# Patient Record
Sex: FEMALE | Race: WHITE | NOT HISPANIC OR LATINO | Employment: FULL TIME | URBAN - METROPOLITAN AREA
[De-identification: names, ages, dates, MRNs, and addresses within clinical notes are randomized per-mention and may not be internally consistent; named-entity substitution may affect disease eponyms.]

---

## 2017-03-14 ENCOUNTER — COMMUNICATION - HEALTHEAST (OUTPATIENT)
Dept: FAMILY MEDICINE | Facility: CLINIC | Age: 27
End: 2017-03-14

## 2017-03-16 ENCOUNTER — OFFICE VISIT - HEALTHEAST (OUTPATIENT)
Dept: FAMILY MEDICINE | Facility: CLINIC | Age: 27
End: 2017-03-16

## 2017-03-16 DIAGNOSIS — Z30.9 CONTRACEPTIVE MANAGEMENT: ICD-10-CM

## 2017-03-16 DIAGNOSIS — Z30.432 ENCOUNTER FOR IUD REMOVAL: ICD-10-CM

## 2017-03-16 ASSESSMENT — MIFFLIN-ST. JEOR: SCORE: 1372.25

## 2017-04-03 ENCOUNTER — COMMUNICATION - HEALTHEAST (OUTPATIENT)
Dept: FAMILY MEDICINE | Facility: CLINIC | Age: 27
End: 2017-04-03

## 2017-04-03 DIAGNOSIS — Z83.719 FAMILY HISTORY OF POLYPS IN THE COLON: ICD-10-CM

## 2017-04-27 ENCOUNTER — RECORDS - HEALTHEAST (OUTPATIENT)
Dept: ADMINISTRATIVE | Facility: OTHER | Age: 27
End: 2017-04-27

## 2017-04-28 ENCOUNTER — RECORDS - HEALTHEAST (OUTPATIENT)
Dept: ADMINISTRATIVE | Facility: OTHER | Age: 27
End: 2017-04-28

## 2017-09-07 ENCOUNTER — COMMUNICATION - HEALTHEAST (OUTPATIENT)
Dept: FAMILY MEDICINE | Facility: CLINIC | Age: 27
End: 2017-09-07

## 2018-02-07 ENCOUNTER — RECORDS - HEALTHEAST (OUTPATIENT)
Dept: ADMINISTRATIVE | Facility: OTHER | Age: 28
End: 2018-02-07

## 2018-05-03 ENCOUNTER — COMMUNICATION - HEALTHEAST (OUTPATIENT)
Dept: FAMILY MEDICINE | Facility: CLINIC | Age: 28
End: 2018-05-03

## 2018-05-03 DIAGNOSIS — Z30.9 CONTRACEPTIVE MANAGEMENT: ICD-10-CM

## 2018-05-29 ENCOUNTER — OFFICE VISIT - HEALTHEAST (OUTPATIENT)
Dept: FAMILY MEDICINE | Facility: CLINIC | Age: 28
End: 2018-05-29

## 2018-05-29 DIAGNOSIS — Z30.41 ENCOUNTER FOR SURVEILLANCE OF CONTRACEPTIVE PILLS: ICD-10-CM

## 2018-05-29 ASSESSMENT — MIFFLIN-ST. JEOR: SCORE: 1373.15

## 2018-05-31 ENCOUNTER — COMMUNICATION - HEALTHEAST (OUTPATIENT)
Dept: FAMILY MEDICINE | Facility: CLINIC | Age: 28
End: 2018-05-31

## 2018-06-11 ENCOUNTER — COMMUNICATION - HEALTHEAST (OUTPATIENT)
Dept: FAMILY MEDICINE | Facility: CLINIC | Age: 28
End: 2018-06-11

## 2018-07-20 ENCOUNTER — COMMUNICATION - HEALTHEAST (OUTPATIENT)
Dept: FAMILY MEDICINE | Facility: CLINIC | Age: 28
End: 2018-07-20

## 2018-07-20 DIAGNOSIS — Z30.9 CONTRACEPTIVE MANAGEMENT: ICD-10-CM

## 2018-08-27 ENCOUNTER — OFFICE VISIT - HEALTHEAST (OUTPATIENT)
Dept: FAMILY MEDICINE | Facility: CLINIC | Age: 28
End: 2018-08-27

## 2018-08-27 ENCOUNTER — COMMUNICATION - HEALTHEAST (OUTPATIENT)
Dept: SCHEDULING | Facility: CLINIC | Age: 28
End: 2018-08-27
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
## 2018-08-27 ASSESSMENT — MIFFLIN-ST. JEOR: SCORE: 1364.98

## 2019-03-17 ENCOUNTER — COMMUNICATION - HEALTHEAST (OUTPATIENT)
Dept: FAMILY MEDICINE | Facility: CLINIC | Age: 29
End: 2019-03-17

## 2019-03-17 DIAGNOSIS — Z30.9 CONTRACEPTIVE MANAGEMENT: ICD-10-CM

## 2019-06-24 ENCOUNTER — COMMUNICATION - HEALTHEAST (OUTPATIENT)
Dept: FAMILY MEDICINE | Facility: CLINIC | Age: 29
End: 2019-06-24

## 2019-06-24 DIAGNOSIS — L70.8 OTHER ACNE: ICD-10-CM

## 2019-08-01 ENCOUNTER — OFFICE VISIT - HEALTHEAST (OUTPATIENT)
Dept: FAMILY MEDICINE | Facility: CLINIC | Age: 29
End: 2019-08-01

## 2019-08-01 ENCOUNTER — HOSPITAL ENCOUNTER (OUTPATIENT)
Dept: MAMMOGRAPHY | Facility: CLINIC | Age: 29
Discharge: HOME | End: 2019-08-01
Attending: NURSE PRACTITIONER

## 2019-08-01 ENCOUNTER — HOSPITAL ENCOUNTER (OUTPATIENT)
Dept: ULTRASOUND IMAGING | Facility: CLINIC | Age: 29
Discharge: HOME | End: 2019-08-01
Attending: NURSE PRACTITIONER

## 2019-08-01 DIAGNOSIS — N63.0 LUMP OR MASS IN BREAST: ICD-10-CM

## 2019-08-01 DIAGNOSIS — Z98.890 HISTORY OF BILATERAL BREAST REDUCTION SURGERY: ICD-10-CM

## 2019-08-01 ASSESSMENT — MIFFLIN-ST. JEOR: SCORE: 1381.76

## 2019-08-02 ENCOUNTER — HOSPITAL ENCOUNTER (OUTPATIENT)
Dept: MAMMOGRAPHY | Facility: CLINIC | Age: 29
Discharge: HOME | End: 2019-08-02
Attending: NURSE PRACTITIONER

## 2019-08-02 ENCOUNTER — HOSPITAL ENCOUNTER (OUTPATIENT)
Dept: ULTRASOUND IMAGING | Facility: CLINIC | Age: 29
Discharge: HOME | End: 2019-08-02
Attending: NURSE PRACTITIONER | Admitting: RADIOLOGY

## 2019-08-02 DIAGNOSIS — N63.0 LUMP OR MASS IN BREAST: ICD-10-CM

## 2019-08-06 ENCOUNTER — COMMUNICATION - HEALTHEAST (OUTPATIENT)
Dept: FAMILY MEDICINE | Facility: CLINIC | Age: 29
End: 2019-08-06

## 2019-08-08 ENCOUNTER — COMMUNICATION - HEALTHEAST (OUTPATIENT)
Dept: FAMILY MEDICINE | Facility: CLINIC | Age: 29
End: 2019-08-08

## 2019-08-08 ENCOUNTER — COMMUNICATION - HEALTHEAST (OUTPATIENT)
Dept: MAMMOGRAPHY | Facility: CLINIC | Age: 29
End: 2019-08-08

## 2019-08-08 LAB
LAB AP CHARGES (HE HISTORICAL CONVERSION): NORMAL
PATH REPORT.COMMENTS IMP SPEC: NORMAL
PATH REPORT.COMMENTS IMP SPEC: NORMAL
PATH REPORT.FINAL DX SPEC: NORMAL
PATH REPORT.GROSS SPEC: NORMAL
PATH REPORT.MICROSCOPIC SPEC OTHER STN: NORMAL
PATH REPORT.RELEVANT HX SPEC: NORMAL
RESULT FLAG (HE HISTORICAL CONVERSION): NORMAL

## 2019-09-02 ENCOUNTER — COMMUNICATION - HEALTHEAST (OUTPATIENT)
Dept: FAMILY MEDICINE | Facility: CLINIC | Age: 29
End: 2019-09-02

## 2019-09-02 DIAGNOSIS — Z30.9 CONTRACEPTIVE MANAGEMENT: ICD-10-CM

## 2019-09-13 ENCOUNTER — OFFICE VISIT - HEALTHEAST (OUTPATIENT)
Dept: FAMILY MEDICINE | Facility: CLINIC | Age: 29
End: 2019-09-13

## 2019-09-13 DIAGNOSIS — Z12.4 SCREENING FOR CERVICAL CANCER: ICD-10-CM

## 2019-09-13 DIAGNOSIS — Z30.9 CONTRACEPTIVE MANAGEMENT: ICD-10-CM

## 2019-09-13 DIAGNOSIS — Z01.419 WELL FEMALE EXAM WITH ROUTINE GYNECOLOGICAL EXAM: ICD-10-CM

## 2019-09-13 ASSESSMENT — MIFFLIN-ST. JEOR: SCORE: 1371.79

## 2019-10-01 ENCOUNTER — COMMUNICATION - HEALTHEAST (OUTPATIENT)
Dept: FAMILY MEDICINE | Facility: CLINIC | Age: 29
End: 2019-10-01

## 2019-10-01 DIAGNOSIS — L70.8 OTHER ACNE: ICD-10-CM

## 2019-12-23 ENCOUNTER — RECORDS - HEALTHEAST (OUTPATIENT)
Dept: ADMINISTRATIVE | Facility: OTHER | Age: 29
End: 2019-12-23

## 2020-03-02 ENCOUNTER — COMMUNICATION - HEALTHEAST (OUTPATIENT)
Dept: FAMILY MEDICINE | Facility: CLINIC | Age: 30
End: 2020-03-02

## 2020-03-02 DIAGNOSIS — Z30.9 CONTRACEPTIVE MANAGEMENT: ICD-10-CM

## 2020-08-19 ENCOUNTER — COMMUNICATION - HEALTHEAST (OUTPATIENT)
Dept: FAMILY MEDICINE | Facility: CLINIC | Age: 30
End: 2020-08-19

## 2020-08-19 DIAGNOSIS — Z30.9 CONTRACEPTIVE MANAGEMENT: ICD-10-CM

## 2020-11-05 ENCOUNTER — RECORDS - HEALTHEAST (OUTPATIENT)
Dept: ADMINISTRATIVE | Facility: OTHER | Age: 30
End: 2020-11-05

## 2020-11-23 ENCOUNTER — COMMUNICATION - HEALTHEAST (OUTPATIENT)
Dept: FAMILY MEDICINE | Facility: CLINIC | Age: 30
End: 2020-11-23

## 2020-11-23 DIAGNOSIS — Z30.9 CONTRACEPTIVE MANAGEMENT: ICD-10-CM

## 2020-11-30 ENCOUNTER — OFFICE VISIT - HEALTHEAST (OUTPATIENT)
Dept: FAMILY MEDICINE | Facility: CLINIC | Age: 30
End: 2020-11-30

## 2020-11-30 DIAGNOSIS — Z30.9 CONTRACEPTIVE MANAGEMENT: ICD-10-CM

## 2020-11-30 DIAGNOSIS — Z00.00 ROUTINE GENERAL MEDICAL EXAMINATION AT A HEALTH CARE FACILITY: ICD-10-CM

## 2020-11-30 ASSESSMENT — MIFFLIN-ST. JEOR: SCORE: 1388.78

## 2020-12-17 ENCOUNTER — COMMUNICATION - HEALTHEAST (OUTPATIENT)
Dept: ADMINISTRATIVE | Facility: CLINIC | Age: 30
End: 2020-12-17

## 2020-12-17 DIAGNOSIS — Z30.9 CONTRACEPTIVE MANAGEMENT: ICD-10-CM

## 2021-01-14 ENCOUNTER — COMMUNICATION - HEALTHEAST (OUTPATIENT)
Dept: FAMILY MEDICINE | Facility: CLINIC | Age: 31
End: 2021-01-14

## 2021-01-14 DIAGNOSIS — Z30.9 CONTRACEPTIVE MANAGEMENT: ICD-10-CM

## 2021-04-08 ENCOUNTER — COMMUNICATION - HEALTHEAST (OUTPATIENT)
Dept: FAMILY MEDICINE | Facility: CLINIC | Age: 31
End: 2021-04-08

## 2021-04-08 DIAGNOSIS — Z30.9 CONTRACEPTIVE MANAGEMENT: ICD-10-CM

## 2021-04-08 RX ORDER — DESOGESTREL AND ETHINYL ESTRADIOL 0.15-0.03
1 KIT ORAL DAILY
Qty: 84 TABLET | Refills: 1 | Status: SHIPPED | OUTPATIENT
Start: 2021-04-08 | End: 2021-09-22

## 2021-05-30 VITALS — BODY MASS INDEX: 22.34 KG/M2 | HEIGHT: 66 IN | WEIGHT: 139 LBS

## 2021-05-30 NOTE — TELEPHONE ENCOUNTER
RN cannot approve Refill Request    RN can NOT refill this medication med is not covered by policy/route to provider. Last office visit: 5/29/2018 Teresa Fuentes MD Last Physical: Visit date not found Last MTM visit: Visit date not found Last visit same specialty: 8/27/2018 Luz Zamora, Karel Servin, DO.  Next visit within 3 mo: Visit date not found  Next physical within 3 mo: Visit date not found      Smitha Alegre, [facility] [place] Connection Triage/Med Refill 6/25/2019    Requested Prescriptions   Pending Prescriptions Disp Refills     doxycycline (MONODOX) 100 MG capsule [Pharmacy Med Name: DOXYCYCLINE MONOHYDRATE 100MG CAPS] 180 capsule 0     Sig: TAKE 1 CAPSULE BY MOUTH TWICE DAILY WITH FOOD AND WATER. MAY CAUSE NAUSEA/ LIGHT SENSITIVITY       There is no refill protocol information for this order

## 2021-05-30 NOTE — TELEPHONE ENCOUNTER
It does not look like I prescribed this medication in the past.  I refilled this for 90 days, but she should follow-up for a med check or physical prior to running out and we can discuss this further.

## 2021-05-31 NOTE — TELEPHONE ENCOUNTER
Edwina I called [facility] yesterday and they were going to get back to us as to a timeline when results are going to be done.  Have not heard yet. See other messages in chart. I did notify patient of that yesterday. Looks like no results yet.  Patient anxious to get results.  I spoke with Pathology again and Pathologist is not in office at this time and will give him message again.  Patient notified.

## 2021-05-31 NOTE — TELEPHONE ENCOUNTER
I called [facility] pathology and they are looking into a timeline as to when results will be final and will call us back. Patient notified that we are still waiting.

## 2021-05-31 NOTE — TELEPHONE ENCOUNTER
Who is calling:  Patient  skylar for Call:  Patient is questioning if there is anything that can be done to find out why the pathology results are not back yet?  Patient reported she was told that the results would be back yesterday.  Date of last appointment with primary care: 8/1/2019  Okay to leave a detailed message: No

## 2021-05-31 NOTE — TELEPHONE ENCOUNTER
Test Results  Who is calling?:  Patient  Who ordered the test:  Edwina Vazquez CNP   Type of test: Lab lump biopsy breast right  Date of test:  8/2/19  Where was the test performed:  In clinic   What are your questions/concerns?:  Patient is asking if clinic knows when to expect results.?  Please advise.  Okay to leave a detailed message?:  No

## 2021-05-31 NOTE — TELEPHONE ENCOUNTER
RN cannot approve Refill Request    Last medication check 5/29/18.  No pending OV    RN can NOT refill this medication Protocol failed and NO refill given. Last office visit: 5/29/2018 Teresa Fuentes MD Last Physical: Visit date not found Last MTM visit: Visit date not found Last visit same specialty: 8/1/2019 Edwina Vazquez CNP.  Next visit within 3 mo: Visit date not found  Next physical within 3 mo: Visit date not found      [facility] Connection Triage/Med Refill 9/2/2019    Requested Prescriptions   Pending Prescriptions Disp Refills     ISIBLOOM 0.15-0.03 mg per tablet [Pharmacy Med Name: ISIBLOOM 0.15MG-0.03MG TABLETS 28S] 84 tablet 0     Sig: TAKE 1 TABLET BY MOUTH DAILY       Oral Contraceptives Protocol Passed - 9/2/2019 10:35 AM        Passed - Visit with PCP or prescribing provider visit in last 12 months      Last office visit with prescriber/PCP: 5/29/2018 Teresa Fuentes MD OR same dept: 8/1/2019 Edwina Vazquez CNP OR same specialty: 8/1/2019 Edwina Vazquez CNP  Last physical: Visit date not found Last MTM visit: Visit date not found   Next visit within 3 mo: Visit date not found  Next physical within 3 mo: Visit date not found  Prescriber OR PCP: Teresa Fuentes MD  Last diagnosis associated with med order: 1. Contraceptive management  - ISIBLOOM 0.15-0.03 mg per tablet [Pharmacy Med Name: ISIBLOOM 0.15MG-0.03MG TABLETS 28S]; TAKE 1 TABLET BY MOUTH DAILY  Dispense: 84 tablet; Refill: 0    If protocol passes may refill for 12 months if within 3 months of last provider visit (or a total of 15 months).

## 2021-05-31 NOTE — PROGRESS NOTES
"  Assessment/ Plan:         1. Lump or mass in breast  US Breast Limited (Focal) Right   2. History of bilateral breast reduction surgery       Small lump on the right outer lower aspect of the breast likely benign in nature patient has overall fibrocystic breast tissue.  Will obtain an ultrasound to evaluate identified lump more closely.  Patient will be assisted with scheduling this appointment.    She was encouraged to return to the clinic in the next month or so for an annual preventative exam.  She is due for a Pap smear.  She is in agreement with this plan.      Subjective:      Tish Wilcox is a 29 y.o. female who presents for concerns or right breast pain on the outside of the breast. Reports in general lumpier breasts but this feels different. No family history of breast cancer.  She denies any other questions or concerns today.  Otherwise she is feeling well.  She is overdue for an annual physical.  She reports that she will schedule this along with a Pap smear.    The following portions of the patient's history were reviewed and updated as appropriate: allergies, current medications and problem list.    Patient Active Problem List   Diagnosis     Acne     Tinnitus       Current Outpatient Medications   Medication Sig     doxycycline (MONODOX) 100 MG capsule TAKE 1 CAPSULE BY MOUTH TWICE DAILY WITH FOOD AND WATER. MAY CAUSE NAUSEA/ LIGHT SENSITIVITY     ISIBLOOM 0.15-0.03 mg per tablet TAKE 1 TABLET BY MOUTH DAILY       Review of Systems   Pertinent items are noted in HPI.      Objective:      /76   Pulse 76   Temp 97.6  F (36.4  C) (Oral)   Ht 5' 6\" (1.676 m)   Wt 142 lb 3.2 oz (64.5 kg)   LMP 07/31/2019 (Exact Date)   BMI 22.95 kg/m      General appearance: alert, appears stated age and cooperative  Breasts: Inspection negative, No nipple retraction or dimpling, No nipple discharge or bleeding, No axillary or supraclavicular adenopathy, Normal to palpation overall, she does have " fibrocystic breasts. She has had a breast reduction. Lump identified measureing about 1cm in diameter oblong in shape on the right lower outer breast approximately 7 o'clock position. Mobile. No external skin changes associated.   Psych: Speech is fluent and thought process is linear, affect is reactive and appropriate    Edwina Vazquez, VIDA  10:36 AM

## 2021-05-31 NOTE — TELEPHONE ENCOUNTER
Test Results  Who is calling?:  Patient   Who ordered the test:  Edwina Vazquez CNP   Type of test: Lab  Date of test:  08/02/19  Where was the test performed:  [facility]   What are your questions/concerns?:  Patient is requesting for results.  Okay to leave a detailed message?:  No

## 2021-06-01 VITALS — WEIGHT: 140.3 LBS | BODY MASS INDEX: 22.55 KG/M2 | HEIGHT: 66 IN

## 2021-06-01 VITALS — BODY MASS INDEX: 22.26 KG/M2 | WEIGHT: 138.5 LBS | HEIGHT: 66 IN

## 2021-06-01 NOTE — PROGRESS NOTES
Assessment/ Plan:      1. Well female exam with routine gynecological exam     2. Screening for cervical cancer  Gynecologic Cytology (PAP Smear)   3. Contraceptive management  desogestrel-ethinyl estradiol (ISIBLOOM) 0.15-0.03 mg per tablet     Healthy female exam completed today. Discussed lifestyle modifications including weight management, eating a balanced diet and getting regular cardiovascular exercise. Discussed self breast exams and how to complete these. Pap Smear updated today. Refills OCP. Plan yearly annual physicals or sooner as needed.     Subjective:      Tish Wilcox is a 29 y.o. female who presents for an annual exam. The patient is sexually active. The patient participates in regular exercise: yes. The patient reports that there is not domestic violence in her life. No concerns. Would like a refill of her OCP. No new contraindications to being on this medication. Due for a PAP today. Got  this summer.     Healthy Habits:   Regular Exercise: Yes  Sunscreen Use: Yes  Healthy Diet: Yes  Dental Visits Regularly: Yes  Seat Belt: Yes  Sexually active: Yes  Self Breast Exam Monthly:Yes  Hemoccults: No  Flex Sig: No  Colonoscopy: No  Lipid Profile: Yes  Glucose Screen: Yes  Prevention of Osteoporosis: No  Last Dexa: No  Guns at Home:  No      Immunization History   Administered Date(s) Administered     DT (pediatric) 07/23/2002     DTaP, historic 1991, 1991, 1991, 12/09/1992, 07/02/1996     HPV Quadrivalent 08/29/2007, 11/21/2007, 05/29/2008     Hep A, historic 08/29/2007, 11/03/2008     Hep B, historic 07/30/1998, 09/02/1998, 01/13/1999     HiB, historic,unspecified 1991, 1991, 1991, 09/09/1992     Influenza, Seasonal, Inj PF IIV3 09/15/2011     Influenza, inj, historic,unspecified 11/15/2005, 11/09/2006, 11/21/2007, 11/03/2008     MMR 09/09/1992, 07/30/1998     Meningococcal MCV4P 08/15/2005     OPV,Trivalent,Historic(6365-9538 only) 1991,  1991, 1992, 1996     Td,adult,historic,unspecified 2007     Tdap 2007     Typhoid Live, Oral 2008     Immunization status: missing doses of Tdap, would like to wait on this.    No exam data present    Gynecologic History  Patient's last menstrual period was 2019.  Contraception: oral progesterone-only contraceptive  Last Pap: 16. Results were: normal  Last mammogram: n/a. Results were: n/a      OB History    Para Term  AB Living       0         SAB TAB Ectopic Multiple Live Births                   Current Outpatient Medications   Medication Sig Dispense Refill     doxycycline (MONODOX) 100 MG capsule TAKE 1 CAPSULE BY MOUTH TWICE DAILY WITH FOOD AND WATER. MAY CAUSE NAUSEA/ LIGHT SENSITIVITY 180 capsule 0     ISIBLOOM 0.15-0.03 mg per tablet TAKE 1 TABLET BY MOUTH DAILY 28 tablet 0     No current facility-administered medications for this visit.      No past medical history on file.  Past Surgical History:   Procedure Laterality Date     NV REDUCTION OF LARGE BREAST      Description: Breast Surgery Reduction Procedure;  Proc Date: 2011;     REDUCTION MAMMAPLASTY Bilateral       BREAST CORE BIOPSY RIGHT Right 2019     WISDOM TOOTH EXTRACTION       Shellfish containing products  Family History   Problem Relation Age of Onset     Heart disease Mother         CAD     No Medical Problems Father      Diabetes Maternal Uncle         type I     Heart disease Maternal Uncle      Heart disease Maternal Grandfather      Cancer Neg Hx      Breast cancer Neg Hx      Colon cancer Neg Hx      Prostate cancer Neg Hx      Social History     Socioeconomic History     Marital status: Single     Spouse name: Not on file     Number of children: Not on file     Years of education: Not on file     Highest education level: Not on file   Occupational History     Not on file   Social Needs     Financial resource strain: Not on file     Food insecurity:     Worry:  "Not on file     Inability: Not on file     Transportation needs:     Medical: Not on file     Non-medical: Not on file   Tobacco Use     Smoking status: Former Smoker     Smokeless tobacco: Never Used     Tobacco comment: In college socially   Substance and Sexual Activity     Alcohol use: Yes     Alcohol/week: 0.0 - 0.6 oz     Drug use: Not on file     Sexual activity: Yes     Partners: Male     Birth control/protection: IUD   Lifestyle     Physical activity:     Days per week: Not on file     Minutes per session: Not on file     Stress: Not on file   Relationships     Social connections:     Talks on phone: Not on file     Gets together: Not on file     Attends Hinduism service: Not on file     Active member of club or organization: Not on file     Attends meetings of clubs or organizations: Not on file     Relationship status: Not on file     Intimate partner violence:     Fear of current or ex partner: Not on file     Emotionally abused: Not on file     Physically abused: Not on file     Forced sexual activity: Not on file   Other Topics Concern     Not on file   Social History Narrative     Not on file       Review of Systems  General:  Denies problem  Eyes: Denies problem  Ears/Nose/Throat: Denies problem  Cardiovascular: Denies problem  Respiratory:  Denies problem  Gastrointestinal:  Denies problem  Genitourinary: Denies problem  Musculoskeletal:  Denies problem  Skin: Denies problem  Neurologic: Denies problem  Psychiatric: Denies problem  Endocrine: Denies problem  Heme/Lymphatic: Denies problem   Allergic/Immunologic: Denies problem        Objective:         Vitals:    09/13/19 1343   BP: 100/70   Pulse: 72   Resp: 16   Weight: 140 lb (63.5 kg)   Height: 5' 6\" (1.676 m)     Body mass index is 22.6 kg/m .    Physical Exam:  General Appearance: Alert, cooperative, no distress, appears stated age  Head: Normocephalic, without obvious abnormality, atraumatic  Eyes: PERRL, conjunctiva/corneas clear, EOM's " intact  Ears: Normal TM's and external ear canals, both ears  Nose: Nares normal, septum midline,mucosa normal, no drainage  Throat: Lips, mucosa, and tongue normal; teeth and gums normal  Neck: Supple, symmetrical, trachea midline, no adenopathy;  thyroid: not enlarged, symmetric, no tenderness/mass/nodules; no carotid bruit or JVD  Back: Symmetric, no curvature, ROM normal, no CVA tenderness  Lungs: Clear to auscultation bilaterally, respirations unlabored  Breasts: No breast masses, tenderness, asymmetry, or nipple discharge.  Heart: Regular rate and rhythm, S1 and S2 normal, no murmur, rub, or gallop, Abdomen: Soft, non-tender, bowel sounds active all four quadrants,  no masses, no organomegaly  Pelvic:Normally developed genitalia with no external lesions or eruptions. Vagina and cervix show no lesions, inflammation, discharge or tenderness. No cystocele, No rectocele. Uterus non-tender, mobile.  No adnexal mass or tenderness.  Extremities: Extremities normal, atraumatic, no cyanosis or edema  Skin: Skin color, texture, turgor normal, no rashes or lesions  Lymph nodes: Cervical, supraclavicular, and axillary nodes normal  Neurologic: Normal

## 2021-06-01 NOTE — TELEPHONE ENCOUNTER
RN cannot approve Refill Request    RN can NOT refill this medication med is not covered by policy/route to provider     . Last office visit: 5/29/2018 Teresa Fuentes MD Last Physical: Visit date not found Last MTM visit: Visit date not found Last visit same specialty: 8/1/2019 Edwina Vazquez CNP.  Next visit within 3 mo: Visit date not found  Next physical within 3 mo: Visit date not found      Frances Lyle, [facility] Connection Triage/Med Refill 10/1/2019    Requested Prescriptions   Pending Prescriptions Disp Refills     doxycycline (MONODOX) 100 MG capsule [Pharmacy Med Name: DOXYCYCLINE MONOHYDRATE 100MG CAPS] 180 capsule 3     Sig: TAKE 1 CAPSULE BY MOUTH TWICE DAILY WITH FOOD AND WATER. MAY CAUSE NAUSEA/ LIGHT SENSITIVITY       There is no refill protocol information for this order

## 2021-06-03 VITALS
DIASTOLIC BLOOD PRESSURE: 70 MMHG | HEART RATE: 72 BPM | RESPIRATION RATE: 16 BRPM | HEIGHT: 66 IN | SYSTOLIC BLOOD PRESSURE: 100 MMHG | BODY MASS INDEX: 22.5 KG/M2 | WEIGHT: 140 LBS

## 2021-06-03 VITALS — BODY MASS INDEX: 22.85 KG/M2 | WEIGHT: 142.2 LBS | HEIGHT: 66 IN

## 2021-06-05 VITALS
OXYGEN SATURATION: 20 % | HEART RATE: 62 BPM | WEIGHT: 143.25 LBS | DIASTOLIC BLOOD PRESSURE: 78 MMHG | HEIGHT: 66 IN | TEMPERATURE: 98.8 F | SYSTOLIC BLOOD PRESSURE: 118 MMHG | BODY MASS INDEX: 23.02 KG/M2

## 2021-06-06 NOTE — TELEPHONE ENCOUNTER
FYI - Status Update  Who is Calling: Patient  Update: Patient has new pharmacy.  Please send to [facility] [place].  Okay to leave a detailed message?:  No return call needed

## 2021-06-09 NOTE — PROGRESS NOTES
"  Assessment/Plan:       1. Encounter for IUD removal  IUD was removed by myself. The IUD strings were visulized after placing speculum in vaginal vault. IUD was removed successfully and was fully intact after evaluation with close inspection. Patient tolerated the procedure well. Advised patient she may have some uterine cramping and to best treat this is to take ibuprofen 600mg with food and drink plenty of water. Advised patient to follow up as needed.     2. Contraceptive management  Patient was restarted on Oral birth control. She previously tolerated this form well. Discussed the risks vs. Benefits of oral birth control pills as well as potential side effects. Patient is in understanding. Patient will follow up as needed or at next office visit as scheduled.   - desogestrel-ethinyl estradiol (APRI) 0.15-0.03 mg per tablet; Take 1 tablet by mouth daily.  Dispense: 84 tablet; Refill: 4        Subjective:      Tish Wilcox is a 26 y.o. female who presents for IUD removal. She would like to go back onto her OCP that she was on before as she has had an increase in acne since having the non-hormonal copper IUD. She has no other questions or concerns today. She does report increased sporadic bleeding since being on the copper IUD. She denies family history of blood clots, personal history of DVT, she is a non-smoker, and no history of migraines.         The following portions of the patient's history were reviewed and updated as appropriate: allergies, current medications and problem list.    Review of Systems   Pertinent items are noted in HPI.      Objective:        Visit Vitals     /70 (Patient Site: Right Arm, Patient Position: Sitting, Cuff Size: Adult Regular)     Pulse 60     Resp 16     Ht 5' 6\" (1.676 m)     Wt 139 lb (63 kg)     LMP 03/14/2017     BMI 22.44 kg/m2       General appearance: alert, appears stated age and cooperative  Pelvic: cervix normal in appearance, external genitalia normal, " no adnexal masses or tenderness, no cervical motion tenderness, rectovaginal septum normal, uterus normal size, shape, and consistency, vagina normal without discharge and IUD strings visulized. String were grasped with tongs and removed successfully.

## 2021-06-10 NOTE — TELEPHONE ENCOUNTER
Refill Approved    Rx renewed per Medication Renewal Policy. Medication was last renewed on 3/2/20.    Frances Lyle, [facility] [place] Connection Triage/Med Refill 8/20/2020     Requested Prescriptions   Pending Prescriptions Disp Refills     ISIBLOOM 0.15-0.03 mg per tablet [Pharmacy Med Name: ISIBLOOM 0.15MG-0.03MG TABLETS 28S] 84 tablet 1     Sig: TAKE 1 TABLET BY MOUTH DAILY       Oral Contraceptives Protocol Passed - 8/19/2020  3:09 AM        Passed - Visit with PCP or prescribing provider visit in last 12 months      Last office visit with prescriber/PCP: 5/29/2018 Teresa Fuentes MD OR same dept: Visit date not found OR same specialty: 8/1/2019 Edwina Vazquez CNP  Last physical: Visit date not found Last MTM visit: Visit date not found   Next visit within 3 mo: Visit date not found  Next physical within 3 mo: Visit date not found  Prescriber OR PCP: Teresa Fuentes MD  Last diagnosis associated with med order: 1. Contraceptive management  - ISIBLOOM 0.15-0.03 mg per tablet [Pharmacy Med Name: ISIBLOOM 0.15MG-0.03MG TABLETS 28S]; TAKE 1 TABLET BY MOUTH DAILY  Dispense: 84 tablet; Refill: 1    If protocol passes may refill for 12 months if within 3 months of last provider visit (or a total of 15 months).

## 2021-06-10 NOTE — TELEPHONE ENCOUNTER
RN cannot approve Refill Request    RN can NOT refill this medication Duplicate RX. Last office visit: 8/1/2019 Edwina Vazquez CNP Last Physical: 9/13/2019 Last MTM visit: Visit date not found Last visit same specialty: 8/1/2019 Edwina Vazquez CNP.  Next visit within 3 mo: Visit date not found  Next physical within 3 mo: Visit date not found      [facility] Triage/Med Refill 8/21/2020    Requested Prescriptions   Pending Prescriptions Disp Refills     ISIBLOOM 0.15-0.03 mg per tablet [Pharmacy Med Name: ISIBLOOM 0.15MG-0.03MG TABLETS 28S] 84 tablet 1     Sig: TAKE 1 TABLET BY MOUTH DAILY       Oral Contraceptives Protocol Passed - 8/19/2020 11:48 AM        Passed - Visit with PCP or prescribing provider visit in last 12 months      Last office visit with prescriber/PCP: 8/1/2019 Edwina Vazquez CNP OR same dept: Visit date not found OR same specialty: 8/1/2019 Edwina Vazquez CNP  Last physical: 9/13/2019 Last MTM visit: Visit date not found   Next visit within 3 mo: Visit date not found  Next physical within 3 mo: Visit date not found  Prescriber OR PCP: Edwina Vazquez CNP  Last diagnosis associated with med order: 1. Contraceptive management  - ISIBLOOM 0.15-0.03 mg per tablet [Pharmacy Med Name: ISIBLOOM 0.15MG-0.03MG TABLETS 28S]; TAKE 1 TABLET BY MOUTH DAILY  Dispense: 84 tablet; Refill: 1    If protocol passes may refill for 12 months if within 3 months of last provider visit (or a total of 15 months).

## 2021-06-13 NOTE — TELEPHONE ENCOUNTER
RN cannot approve Refill Request    RN can NOT refill this medication Protocol failed and NO refill given. Last office visit: 5/29/2018 Teresa Fuentes MD Last Physical: Visit date not found Last MTM visit: Visit date not found Last visit same specialty: 8/1/2019 Edwina Vazquez CNP.  Next visit within 3 mo: Visit date not found  Next physical within 3 mo: Visit date not found      Frances Lyle, [facility] Connection Triage/Med Refill 11/23/2020    Requested Prescriptions   Pending Prescriptions Disp Refills     desogestreL-ethinyl estradioL (ISIBLOOM) 0.15-0.03 mg per tablet 84 tablet 0     Sig: Take 1 tablet by mouth daily.       Oral Contraceptives Protocol Failed - 11/23/2020  8:00 AM        Failed - Visit with PCP or prescribing provider visit in last 12 months      Last office visit with prescriber/PCP: 5/29/2018 Teresa Fuentes MD OR same dept: Visit date not found OR same specialty: 8/1/2019 Edwina Vazquez CNP  Last physical: Visit date not found Last MTM visit: Visit date not found   Next visit within 3 mo: Visit date not found  Next physical within 3 mo: Visit date not found  Prescriber OR PCP: Teresa Fuentes MD  Last diagnosis associated with med order: 1. Contraceptive management  - desogestreL-ethinyl estradioL (ISIBLOOM) 0.15-0.03 mg per tablet; Take 1 tablet by mouth daily.  Dispense: 84 tablet; Refill: 0    If protocol passes may refill for 12 months if within 3 months of last provider visit (or a total of 15 months).

## 2021-06-13 NOTE — TELEPHONE ENCOUNTER
Reason for Call: Medication Refill- Pt would like a year supply of her BC med- pt was only prescribed a month supply at her last OV 11/30/2020.     Do you use a [facility]?  no    Name of the medication requested: Isibloom    Other request: Please send rx for a year supply to her pharm- [facility] -Address is  [address] [place] [address].     Can we leave a detailed message on this number? Yes    Phone number patient can be reached at:   Cell number on file:    Telephone Information:   Mobile [phone]       Best Time: Anytime    Call taken on 12/17/2020 at 11:01 AM by Kimberly Edwards

## 2021-06-13 NOTE — TELEPHONE ENCOUNTER
FYI - Status Update  Who is Calling: Patient  Update: I am out of this medication today and I will call back to schedule an appointment today.  Okay to leave a detailed message?:  Yes

## 2021-06-16 NOTE — TELEPHONE ENCOUNTER
Refill Approved    Rx renewed per Medication Renewal Policy. Medication was last renewed on 12/17/20, last OV 11/30/20.    Di Hankins, [facility] Triage/Med Refill 4/8/2021     Requested Prescriptions   Pending Prescriptions Disp Refills     ISIBLOOM 0.15-0.03 mg per tablet [Pharmacy Med Name: ISIBLOOM 0.15MG-0.03MG TABLETS 28S] 84 tablet 0     Sig: TAKE 1 TABLET BY MOUTH DAILY       Oral Contraceptives Protocol Passed - 4/8/2021  2:31 AM        Passed - Visit with PCP or prescribing provider visit in last 12 months      Last office visit with prescriber/PCP: Visit date not found OR same dept: Visit date not found OR same specialty: 8/1/2019 Edwina Vazquez CNP  Last physical: 11/30/2020 Last MTM visit: Visit date not found   Next visit within 3 mo: Visit date not found  Next physical within 3 mo: Visit date not found  Prescriber OR PCP: Tennille Nicholson DO  Last diagnosis associated with med order: 1. Contraceptive management  - ISIBLOOM 0.15-0.03 mg per tablet [Pharmacy Med Name: ISIBLOOM 0.15MG-0.03MG TABLETS 28S]; Take 1 tablet by mouth daily.  Dispense: 84 tablet; Refill: 0    If protocol passes may refill for 12 months if within 3 months of last provider visit (or a total of 15 months).

## 2021-06-18 NOTE — PROGRESS NOTES
"  Assessment/Plan:      Encounter for surveillance of contraceptive pills  Patient has been doing well on her current oral contraceptive pill.  She does not desire any changes.  Temporary refill was previously given.  Advised patient to request refills as usual and he will be given for 1 year from today.  Her Pap smear is up-to-date.        Subjective:       Tish Wilcox is a 27 y.o. female who presents for a med check related to her oral contraceptive pill.  This continues to work well for her and she is happy with this method.  She has no personal or family history of blood clots.  She has no other questions or concerns today, has been feeling well.  Her Pap smear is up to date.    The following portions of the patient's history were reviewed and updated as appropriate: allergies, current medications, past family history, past medical history, past social history and problem list.      Current Outpatient Prescriptions:      ISIBLOOM 0.15-0.03 mg per tablet, TAKE 1 TABLET BY MOUTH DAILY, Disp: 84 tablet, Rfl: 0     adapalene (DIFFERIN) 0.1 % cream, APPLY 1 TOPICALLY AT BEDTIME FOR ACNE. APPLY A THIN LAYER OF MOISTURIZER IMMEDIATELY PRIOR TO USE, Disp: , Rfl: 11     doxycycline (MONODOX) 100 MG capsule, TK ONE C PO BID WITH FOOD AND WATER. MAY CAUSE NAUSEA/ LIGHT SENSITIVITY, Disp: , Rfl: 6     FINACEA 15 % gel, Apply 1 application topically 2 (two) times a day., Disp: , Rfl: 11    Review of Systems   Pertinent items are noted in HPI.      Objective:      /68 (Patient Site: Right Arm, Patient Position: Sitting, Cuff Size: Adult Regular)  Pulse 76  Resp 16  Ht 5' 6\" (1.676 m)  Wt 140 lb 4.8 oz (63.6 kg)  LMP 05/09/2018  Breastfeeding? No  BMI 22.65 kg/m2    General appearance: alert, appears stated age and cooperative  Due to the nature of the visit, no further examination is performed today.          "

## 2021-06-20 ENCOUNTER — HEALTH MAINTENANCE LETTER (OUTPATIENT)
Age: 31
End: 2021-06-20

## 2021-06-20 NOTE — PROGRESS NOTES
"Assessment / Impression     1. Bug bite with infection, initial encounter           Plan:     I expressed my concern that this bug bite appears infected and spreading.  We discussed treatment options and she was given a prescription for cephalexin.  If her symptoms do not improve she may return to the clinic.    Subjective:      HPI: Tish Wilcox is a 28 y.o. female who presents to the clinic to be evaluated for a bug bite on the back of her neck.  She first noticed this yesterday and it seems to be spreading.  This morning she noticed a long streak extending towards her left ear.  She denies fevers.  She does not feel ill and she has not felt tender lymph nodes in her neck.        Review of Systems  All other systems reviewed and are negative.     History   Smoking Status     Former Smoker   Smokeless Tobacco     Never Used     Comment: In college socially       Family History   Problem Relation Age of Onset     Heart disease Mother      CAD     No Medical Problems Father      Diabetes Maternal Uncle      type I     Heart disease Maternal Uncle      Heart disease Maternal Grandfather      Cancer Neg Hx      Breast cancer Neg Hx      Colon cancer Neg Hx      Prostate cancer Neg Hx        Objective:     /72 (Patient Site: Left Arm, Patient Position: Sitting, Cuff Size: Adult Regular)  Pulse 64  Temp 97.7  F (36.5  C) (Oral)   Resp 16  Ht 5' 6\" (1.676 m)  Wt 138 lb 8 oz (62.8 kg)  LMP 07/30/2018 (Approximate)  Breastfeeding? No  BMI 22.35 kg/m2  Physical Examination: General appearance - alert, well appearing, and in no distress  Eyes: pupils equal and reactive, extraocular eye movements intact  Mouth: mucous membranes moist, pharynx normal without lesions  Neck: supple, no significant adenopathy  Lungs: clear to auscultation, no wheezes, rales or rhonchi, symmetric air entry  Heart: normal rate, regular rhythm, normal S1, S2, no murmurs, rubs, clicks or gallops  Neurological: alert, oriented, " normal speech, no focal findings or movement disorder noted.    Extremities: No edema, no clubbing or cyanosis  Psychiatric: Normal affect. Does not appear anxious or depressed.  Skin: There is a circular area of erythema in the left posterior neck that measures 3.5 x 2.5 cm.  There is a linear area of erythema extending up 2.5 cm and to the left and additional 2.5 cm towards the left ear.  No results found for this or any previous visit (from the past 168 hour(s)).    Current Outpatient Prescriptions   Medication Sig     doxycycline (MONODOX) 100 MG capsule TK ONE C PO BID WITH FOOD AND WATER. MAY CAUSE NAUSEA/ LIGHT SENSITIVITY     ISIBLOOM 0.15-0.03 mg per tablet TAKE 1 TABLET BY MOUTH DAILY     cephalexin (KEFLEX) 500 MG capsule Take 1 capsule (500 mg total) by mouth 4 (four) times a day for 10 days.

## 2021-06-25 NOTE — TELEPHONE ENCOUNTER
Refill Approved    Rx renewed per Medication Renewal Policy. Medication was last renewed on 7/22/18.    Frances Lyle, [facility] Triage/Med Refill 3/18/2019     Requested Prescriptions   Pending Prescriptions Disp Refills     ISIBLOOM 0.15-0.03 mg per tablet [Pharmacy Med Name: ISIBLOOM 0.15MG-0.03MG TABLETS 28S] 84 tablet 0     Sig: TAKE 1 TABLET BY MOUTH DAILY    Oral Contraceptives Protocol Passed - 3/18/2019  7:31 AM       Passed - Visit with PCP or prescribing provider visit in last 12 months     Last office visit with prescriber/PCP: 5/29/2018 Teresa Fuentes MD OR same dept: 8/27/2018 Karel Arias DO OR same specialty: 8/27/2018 Karel Arias DO  Last physical: Visit date not found Last MTM visit: Visit date not found   Next visit within 3 mo: Visit date not found  Next physical within 3 mo: Visit date not found  Prescriber OR PCP: Teresa Fuentes MD  Last diagnosis associated with med order: 1. Contraceptive management  - ISIBLOOM 0.15-0.03 mg per tablet [Pharmacy Med Name: ISIBLOOM 0.15MG-0.03MG TABLETS 28S]; TAKE 1 TABLET BY MOUTH DAILY  Dispense: 84 tablet; Refill: 0    If protocol passes may refill for 12 months if within 3 months of last provider visit (or a total of 15 months).

## 2021-06-30 NOTE — PROGRESS NOTES
Progress Notes by Tennille Nicholson DO at 11/30/2020 10:20 AM     Author: Tennille Nicholson DO Service: -- Author Type: Physician    Filed: 11/30/2020 11:36 AM Encounter Date: 11/30/2020 Status: Signed    : Tennille Nicholson DO (Physician)       FEMALE PREVENTATIVE EXAM    Assessment and Plan:   Patient has been advised of split billing requirements and indicates understanding: Yes    Tish was seen today for annual exam.    Routine general medical examination at a health care facility: Patient doing well.  No acute concerns today.  Patient is due for her Tdap and influenza.  Up-to-date with all other screening guidelines.  Patient in agreement with this plan  -     Td, Preservative Free (green label)  -     Influenza, Seasonal Quad, PF =/> 6months    Contraceptive management: Patient requesting refill of her birth control.  Patient has a normal blood pressure, no underlying medical issues, and is a non-smoker.  -     desogestreL-ethinyl estradioL (ISIBLOOM) 0.15-0.03 mg per tablet; Take 1 tablet by mouth daily.    Next follow up:  No follow-ups on file.    Immunization Review  Adult Imm Review: Due today, orders placed    I discussed the following with the patient:   Adult Healthy Living: Importance of regular exercise  Healthy nutrition  Stress management  Contraception options    I have had an Advance Directives discussion with the patient.    Subjective:   Chief Complaint: Tish Lara is an 30 y.o. female here for a preventative health visit.  Patient has been advised of split billing requirements and indicates understanding: Yes  HPI:  No concerns     Patient currently lives in [place].  She has not established with care there yet.  Patient lives with her .  She is currently working at home due to Covid pandemic    Healthy Habits  Are you taking a daily aspirin? No  Do you typically exercising at least 40 min, 3-4 times per week?  Yes, personal training 2 times a week and  "running  Do you usually eat at least 4 servings of fruit and vegetables a day, include whole grains and fiber and avoid regularly eating high fat foods? Yes  Have you had an eye exam in the past two years? NO  Do you see a dentist twice per year? Yes  Do you have any concerns regarding sleep? No    Safety Screen  If you own firearms, are they secured in a locked gun cabinet or with trigger locks? NO  Do you feel you are safe where you are living?: Yes (11/30/2020 10:23 AM)  Do you feel you are safe in your relationship(s)?: Yes (11/30/2020 10:23 AM)      Review of Systems:  Please see above.  The rest of the review of systems are negative for all systems.     Pap History:   No - age 21-29 PAP every 3 years recommended   Cancer Screening       Status Date      PAP SMEAR Next Due 9/13/2022      Done 9/13/2019 GYNECOLOGIC CYTOLOGY (PAP SMEAR)     Patient has more history with this topic...          Patient Care Team:  Teresa Fuentes MD as PCP - General (Family Medicine)  Edwina Vazquez CNP as Assigned PCP        History     Reviewed By Date/Time Sections Reviewed    Kady Cote MA 11/30/2020 10:26 AM Tobacco            Objective:   Vital Signs:   Visit Vitals  /78 (Patient Site: Left Arm, Patient Position: Sitting, Cuff Size: Adult Regular)   Pulse 62   Temp 98.8  F (37.1  C) (Oral)   Ht 5' 6.14\" (1.68 m)   Wt 143 lb 4 oz (65 kg)   LMP 11/09/2020 (Approximate)   SpO2 (!) 20%   Breastfeeding No   BMI 23.02 kg/m           PHYSICAL EXAM  GENERAL:  Patient alert, in no acute distress.  EYES: PERRLA. Extraoccular movements intact, pupils equal, reactive to light and accommodation.  Normal conjunctiva and lids.   ENT:  Hearing grossly normal.  Normal appearance to ears and nose.  Bilateral TMs, external canals, oropharynx normal. Normal lips, gums and teeth.  Normal nasal mucosa, septum and turbinates.  NECK:  Supple, without thyromegaly or mass.  RESP:  Clear to auscultation without crackles, wheezes " or distress.  Normal respiratory effort.   CV:  Regular rate and rhythm without murmurs, rubs or gallops.  Normal carotid, abdominal aorta, femoral and pedal pulses.  No varicosities or edema.  ABDOMEN:  Soft, non-tender, without hepatosplenomegaly, masses, or hernias.   LYMPHATIC: Normal palpation of neck, groin and axilla..  No lymphadenopathy.  No bruising.  NEURO:  CN II-XII intact, motor & sensory function all intact.  DTR and reflexes normal.  PSYCHIATRIC:  Alert & oriented with normal mood and affect.  Good judgment and insight.  SKIN:  Normal inspection and palpation.  MUSCULOSKELETAL: Normal gait and station.  - Spine / Ribs / Pelvis: Normal inspection, ROM, stability and strength: Spine, Head, Neck, Upper and Lower Extremities.             Medication List          Accurate as of November 30, 2020 11:34 AM. If you have any questions, ask your nurse or doctor.            CONTINUE taking these medications    Isibloom 0.15-0.03 mg per tablet  INSTRUCTIONS: Take 1 tablet by mouth daily.  Generic drug: desogestreL-ethinyl estradioL           STOP taking these medications    doxycycline 100 MG capsule  Also known as: MONODOX  Stopped by: Tennille Nicholson, DO           Where to Get Your Medications      These medications were sent to [facility] - [address], [place] - [address] [facility] [address] [facility] [address]    Phone: [phone]     Isibloom 0.15-0.03 mg per tablet         Additional Screenings Completed Today:

## 2021-08-05 ENCOUNTER — TELEPHONE (OUTPATIENT)
Dept: FAMILY MEDICINE | Facility: CLINIC | Age: 31
End: 2021-08-05

## 2021-08-05 ENCOUNTER — NURSE TRIAGE (OUTPATIENT)
Dept: NURSING | Facility: CLINIC | Age: 31
End: 2021-08-05

## 2021-08-05 NOTE — TELEPHONE ENCOUNTER
Left message for patient to call back.  Has this happened before? How long has it been going on?  Is it both sides of the jaw?  Has see seen her dentist?  Does anything help it?

## 2021-08-05 NOTE — TELEPHONE ENCOUNTER
In [place] and having locking jaw. Lives there.    Triage nurse not licensed in the state the patient is calling from and is unable to triage.    Transferred to scheduling for an appointment as she is flying here soon and wants to be seen in future.    Smitha Alegre RN  [facility] Nurse Advisor

## 2021-08-05 NOTE — TELEPHONE ENCOUNTER
Reason for Call:  Other Appointment    Detailed comments: Appointment request - Locked Jaw/Jaw Pain with inflammation.  Patient lives out of state and will be visiting 08/19 - 08/28 and would like to see Dr. Slime Fuentes only while in state.  Anytime of day would work.    Phone Number Patient can be reached at: Home number on file [phone] (home)    Best Time: Anytime    Can we leave a detailed message on this number? YES    Call taken on 8/5/2021 at 3:02 PM by Candace Singer

## 2021-08-06 NOTE — TELEPHONE ENCOUNTER
I called and spoke with Bonnie at length about what is going on with her.  She had an episode of her jaw locking up about 7 weeks ago. Her jaw had been very tight prior to that and had been chewing a lot of gum.  Figures it was over used.  Since that episode she has been having other sx that seem to have stemmed from the jaw trauma.  Seem like her body is have a flight or fight response or adrenaline rush.  Her hands will go numb and tingly, SOB, Sweating, eye tremors, can't sleep.  She was seen by a general provider and was sent to a couple of different specialist for her heart and had a bunch of tests done, but no one can tell her what is happening to her.  She was feeling very lost and overwhelmed.  Which is why she was going to come see you as her previous provider.       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  This provider is addressing the sx and has also recommended therapy for the jaw and possibly some acupuncture to reset the bodies autonomic response.  She feels better now with a plan in place and is comfortable following with the provider in [place].    I advised that I would send this information to you for your review and if you had anything to add, comment or recommend, I would call her back.  And on the flip side if she changes her mind or has more concerns she could always call us back too.

## 2021-09-22 DIAGNOSIS — Z30.41 ENCOUNTER FOR SURVEILLANCE OF CONTRACEPTIVE PILLS: Primary | ICD-10-CM

## 2021-09-22 DIAGNOSIS — Z30.9 CONTRACEPTIVE MANAGEMENT: ICD-10-CM

## 2021-09-22 RX ORDER — DESOGESTREL AND ETHINYL ESTRADIOL 0.15-0.03
1 KIT ORAL DAILY
Qty: 84 TABLET | Refills: 0 | Status: SHIPPED | OUTPATIENT
Start: 2021-09-22 | End: 2021-12-15

## 2021-09-22 NOTE — TELEPHONE ENCOUNTER
Incoming call from pt requesting refill of her birth control. Pt stated she will not be in [place] often anymore so she is establishing with a provider in [place]. Wondering if you can do one more refill for her to cover until she sees the new

## 2021-10-11 ENCOUNTER — HEALTH MAINTENANCE LETTER (OUTPATIENT)
Age: 31
End: 2021-10-11

## 2022-01-30 ENCOUNTER — HEALTH MAINTENANCE LETTER (OUTPATIENT)
Age: 32
End: 2022-01-30

## 2022-09-25 ENCOUNTER — HEALTH MAINTENANCE LETTER (OUTPATIENT)
Age: 32
End: 2022-09-25

## 2023-05-13 ENCOUNTER — HEALTH MAINTENANCE LETTER (OUTPATIENT)
Age: 33
End: 2023-05-13